# Patient Record
Sex: FEMALE
[De-identification: names, ages, dates, MRNs, and addresses within clinical notes are randomized per-mention and may not be internally consistent; named-entity substitution may affect disease eponyms.]

---

## 2017-03-06 NOTE — MY
EXAMINATION:  Bilateral digital mammography utilizing CAD.

 

HISTORY:  Screening exam.  Comparison is made to previous studies dated 2/25/2016, 1/29/2015.

 

FINDINGS: Bilateral scattered fibroglandular densities.   No suspicious calcifications, masses or ar
chitectural distortions.  No pathologic appearing lymph nodes,  no abnormal skin thickening or nippl
e inversion.  CAD highlighted regions appear normal at this time. 

 

IMPRESSION:  BI-RADS category I - negative mammogram.    Continued screening according to ACR-ACS gu
idelines suggested.  

 

THE FALSE-NEGATIVE RATE OF MAMMOGRAM IS APPROXIMATELY 10%. MANAGEMENT OF A PALPABLE ABNORMALITY MUST
 BE BASED UPON CLINICAL GROUNDS. SENSITIVITY FOR DETECTION OF ABNORMALITIES IN DENSE BREASTS IS LOW.

 

NOTE: A letter will be sent to the patient regarding findings.

 

[facility] -- [facility] [place]  [phone] - FAX  [phone]

## 2017-10-12 NOTE — PCM.OPNOTE
- General Post-Op/Procedure Note


Date of Surgery/Procedure: 10/12/17


Operative Procedure(s): Colonoscopy


Findings: 


Transverse colon polyp





Pre Op Diagnosis: Colonoscopy


Post-Op Diagnosis: Transverse colon polyp (proximal)


Anesthesia Technique: MAC


Primary Surgeon: Barbara Dumont


Condition: Good

## 2017-10-12 NOTE — PCM.PREANE
Preanesthetic Assessment





- Anesthesia/Transfusion/Family Hx


Anesthesia History: Prior Anesthesia Without Reaction


Family History of Anesthesia Reaction: No


Transfusion History: No Prior Transfusion(s)





- Review of Systems


General: No Symptoms


Pulmonary: No Symptoms


Cardiovascular: No Symptoms


Gastrointestinal: No Symptoms


Neurological: No Symptoms


Other: Reports: None





- Physical Assessment


NPO Status Date: 10/11/17


Height: 1.6 m


Weight: 78.471 kg


ASA Class: 2


Mental Status: Alert & Oriented x3


Airway Class: Mallampati = 2


Dentition: Reports: Normal Dentition


ROM/Head Extension: Full


Lungs: Clear to Auscultation, Normal Respiratory Effort


Cardiovascular: Regular Rate, Regular Rhythm





- Allergies


Allergies/Adverse Reactions: 


 Allergies











Allergy/AdvReac Type Severity Reaction Status Date / Time


 


Penicillins Allergy  Hypotension Verified 10/09/17 10:03


 


Sulfa (Sulfonamide Allergy  Rash Verified 10/09/17 10:03





Antibiotics)     














- Anesthesia Plan


Pre-Op Medication Ordered: None





- Acknowledgements


Anesthesia Type Planned: MAC


Pt an Appropriate Candidate for the Planned Anesthesia: Yes


Alternatives and Risks of Anesthesia Discussed w Pt/Guardian: Yes


Pt/Guardian Understands and Agrees with Anesthesia Plan: Yes





PreAnesthesia Questionnaire


Cardiovascular History: Reports: Heart Murmur, Hypertension


Gastrointestinal History: Reports: None


Genitourinary History: Reports: UTI, Recurrent


OB/GYN History: Reports: Pregnancy


Musculoskeletal History: Reports: None, Arthritis


Endocrine/Metabolic History: Reports: Hypothyroidism


Oncologic (Cancer) History: Reports: Ovarian





- Past Surgical History


Head Surgeries/Procedures: Reports: None


HEENT Surgical History: Reports: Cataract Surgery, Tonsillectomy


Other HEENT Surgeries/Procedures: cataract surgery on 10/5/17


GI Surgical History: Reports: Appendectomy, Colonoscopy, EGD


Female  Surgical History: Reports: Hysterectomy, Salpingo-Oophorectomy


Other Female  Surgeries/Procedures: total hysterectomy for ovarian cancer


Endocrine Surgical History: Reports: Thyroidectomy


Musculoskeletal Surgical History: Reports: Other (See Below)


Other Musculoskeletal Surgeries/Procedures:: hx bunionectomy





- SUBSTANCE USE


Smoking Status *Q: Never Smoker


Recreational Drug Use History: No





- HOME MEDS


Home Medications: 


 Home Meds





Cholecalciferol (Vitamin D3) [Vitamin D3] 1 - 2 tab PO DAILY 10/09/17 [History]


Levothyroxine Sodium [Synthroid] 25 mcg PO DAILY 10/09/17 [History]


Metoprolol Succinate 25 mg PO DAILY 10/09/17 [History]


Potassium 99 mg PO DAILY 10/09/17 [History]


Vitamin B Complex 1 tab PO DAILY 10/09/17 [History]











- CURRENT (IN HOUSE) MEDS


Current Meds: 





 Current Medications





Lactated Ringer's (Ringers, Lactated)  1,000 mls @ 125 mls/hr IV ASDIRECTED NETTIE


Sodium Chloride (Saline Flush)  10 ml FLUSH ASDIRECTED PRN


   PRN Reason: Keep Vein Open


Sodium Chloride (Saline Flush)  2.5 ml FLUSH ASDIRECTED PRN


   PRN Reason: Keep Vein Open





Discontinued Medications





Fentanyl (Sublimaze) Confirm Administered Dose 100 mcg .ROUTE .STK-MED ONE


   Stop: 10/12/17 07:03


Lidocaine (Xylocaine-Mpf 2%) Confirm Administered Dose 5 ml .ROUTE .STK-MED ONE


   Stop: 10/12/17 07:03


Propofol (Diprivan  20 Ml) Confirm Administered Dose 400 mg .ROUTE .STK-MED ONE


   Stop: 10/12/17 07:03

## 2017-10-13 NOTE — OR
SURGEON:

RENETTA LINDSAY MD

 

DATE OF PROCEDURE:  10/12/2017

 

PREOPERATIVE DIAGNOSIS:

Screening colonoscopy.

 

POSTOPERATIVE DIAGNOSIS:

One transverse colon polyp.

 

PROCEDURE PERFORMED:

Screening colonoscopy.

 

ANESTHESIA:

MAC.

 

EXTENT OF EXAM:

To the cecum.

 

INSTRUMENT USED:

Olympus colonoscope.

 

PREPARATION:

Good.

 

LIMITATIONS:

None.

 

INDICATIONS FOR EXAMINATION:

The patient is a 67-year-old female, who had a normal screening colonoscopy 10

years ago.  She is here for a repeat colonoscopy.  We discussed the procedure as

well as expected perioperative course.  We discussed the risks, including

bleeding, infection, or damage to surrounding structures, including perforation.

The patient verbalized understanding and wished to proceed.

 

PROCEDURE IN DETAIL:

The patient was brought into the endoscopy suite and placed in the left lateral

decubitus position.  A time-out was completed verifying the patient's name, age,

date of birth, allergies, and procedure to be performed.  Monitored anesthesia

care was induced and continuous oxygen was provided via nasal cannula throughout

the procedure.  After adequate sedation was achieved, a digital rectal exam was

performed.  This exam was within normal limits.  A well lubricated colonoscope

was inserted in the rectum and advanced under direct visualization to the level

of the cecum.  The cecum was identified by both visual and anatomic landmarks.

Photograph was taken of the cecal cap as well as with the scope retroflexed

within the cecum.  The scope was then straightened out and fully withdrawn while

examining the color, texture, anatomy, and integrity of the mucosa from the

cecum to the anal canal.  The patient was found to have one proximal transverse

colon polyp, which was removed using a cold biopsy forceps.  The remainder of

the colon was normal.  The scope was brought into the rectum and retroflexed to

allow visualization of the anal canal opening.  This appeared normal and a

photograph was taken.  The scope was then straightened out and removed from the

patient.  The cecum to the anus time was 11 minutes.  The patient was

transferred to the recovery room in stable condition.

 

ENDOSCOPIC DIAGNOSIS:

One transverse colon polyp.

 

RECOMMENDATION:

Follow up in clinic in 2 weeks.

 

 

VIRIDIANA HERRMANN

DD:  10/13/2017 08:24:09

DT:  10/13/2017 10:13:56

Job #:  [id number]

## 2022-12-28 ENCOUNTER — HOSPITAL ENCOUNTER (OUTPATIENT)
Dept: HOSPITAL 56 - MW.SDS | Age: 72
Discharge: HOME | End: 2022-12-28
Attending: SURGERY
Payer: COMMERCIAL

## 2022-12-28 VITALS — DIASTOLIC BLOOD PRESSURE: 85 MMHG | HEART RATE: 84 BPM | SYSTOLIC BLOOD PRESSURE: 115 MMHG

## 2022-12-28 DIAGNOSIS — Z90.710: ICD-10-CM

## 2022-12-28 DIAGNOSIS — Z88.0: ICD-10-CM

## 2022-12-28 DIAGNOSIS — Z88.2: ICD-10-CM

## 2022-12-28 DIAGNOSIS — Z98.890: ICD-10-CM

## 2022-12-28 DIAGNOSIS — M19.90: ICD-10-CM

## 2022-12-28 DIAGNOSIS — E03.9: ICD-10-CM

## 2022-12-28 DIAGNOSIS — Z79.890: ICD-10-CM

## 2022-12-28 DIAGNOSIS — Z12.11: Primary | ICD-10-CM

## 2022-12-28 DIAGNOSIS — Z79.899: ICD-10-CM

## 2022-12-28 DIAGNOSIS — Z86.010: ICD-10-CM

## 2022-12-28 DIAGNOSIS — I10: ICD-10-CM

## 2022-12-28 PROCEDURE — 45378 DIAGNOSTIC COLONOSCOPY: CPT
